# Patient Record
Sex: FEMALE | Race: WHITE
[De-identification: names, ages, dates, MRNs, and addresses within clinical notes are randomized per-mention and may not be internally consistent; named-entity substitution may affect disease eponyms.]

---

## 2017-11-13 ENCOUNTER — HOSPITAL ENCOUNTER (OUTPATIENT)
Dept: HOSPITAL 92 - ULT | Age: 32
Discharge: HOME | End: 2017-11-13
Attending: FAMILY MEDICINE
Payer: COMMERCIAL

## 2017-11-13 DIAGNOSIS — Z3A.19: ICD-10-CM

## 2017-11-13 DIAGNOSIS — Z34.82: Primary | ICD-10-CM

## 2017-11-13 PROCEDURE — 76805 OB US >/= 14 WKS SNGL FETUS: CPT

## 2017-11-13 NOTE — ULT
EXAM:

OB ULTRASOUND:

 

HISTORY: 

A 20-week exam.  Evaluate anatomy, size and dates.

 

COMPARISON: 

None.

 

TECHNIQUE: 

Sagittal and transverse imaging of the gravid uterus is performed.

 

FINDINGS: 

Single intrauterine gestation, vertex presentation.  Fetal heart tones with a rate of 155 b.p.m.

 

Cervical length is 4.9 cm.  Posterior placenta.  No definite previa. 

 

Fetal Biometry:

BPD                       4.44 cm, 19 weeks 3 days

Head circumference       17.72 cm, 20 weeks 1 day

Abdominal circumference  15.52 cm, 20 weeks 5 days

Femur length              3.42 cm, 20 weeks 5 days

 

Average by sonography is 20 weeks 1 day.  Estimated fetal weight is 366 gm.

 

Fetal Survey:

The following structures are adequately demonstrated:  lateral ventricle, cord insertion, 3-vessel c
ord, nose and lips, and urinary bladder.

 

Limited evaluation of the cerebellum, 4-chamber heart, stomach, spine, and kidneys.  Visualized extr
emities appear to be unremarkable.

 

IMPRESSION: 

1.  Single intrauterine gestation with fetal heart tones.  Average by sonography is 20 weeks 1 day.

2.  Fetal survey as above.  Examination is limited due to patient body habitus.

 

POS: ZULMA

## 2018-02-06 ENCOUNTER — HOSPITAL ENCOUNTER (OUTPATIENT)
Dept: HOSPITAL 92 - BICULT | Age: 33
Discharge: HOME | End: 2018-02-06
Attending: FAMILY MEDICINE
Payer: COMMERCIAL

## 2018-02-06 DIAGNOSIS — O36.63X0: Primary | ICD-10-CM

## 2018-02-06 DIAGNOSIS — Z3A.34: ICD-10-CM

## 2018-02-06 PROCEDURE — 82951 GLUCOSE TOLERANCE TEST (GTT): CPT

## 2018-02-06 PROCEDURE — 36415 COLL VENOUS BLD VENIPUNCTURE: CPT

## 2018-02-06 PROCEDURE — 82952 GTT-ADDED SAMPLES: CPT

## 2018-02-06 PROCEDURE — 76816 OB US FOLLOW-UP PER FETUS: CPT

## 2018-03-26 ENCOUNTER — HOSPITAL ENCOUNTER (INPATIENT)
Dept: HOSPITAL 92 - L&D | Age: 33
LOS: 4 days | Discharge: HOME | End: 2018-03-30
Attending: FAMILY MEDICINE | Admitting: FAMILY MEDICINE
Payer: COMMERCIAL

## 2018-03-26 VITALS — BODY MASS INDEX: 37.4 KG/M2

## 2018-03-26 DIAGNOSIS — Z3A.39: ICD-10-CM

## 2018-03-26 DIAGNOSIS — O99.343: Primary | ICD-10-CM

## 2018-03-26 DIAGNOSIS — F98.8: ICD-10-CM

## 2018-03-26 LAB
HBSAG INDEX: 0.15 S/CO (ref 0–0.99)
HGB BLD-MCNC: 11.4 G/DL (ref 12–16)
MCH RBC QN AUTO: 30.8 PG (ref 27–31)
MCV RBC AUTO: 89.4 FL (ref 81–99)
PLATELET # BLD AUTO: 191 THOU/UL (ref 130–400)
RBC # BLD AUTO: 3.7 MILL/UL (ref 4.2–5.4)
SYPHILIS ANTIBODY INDEX: 0.07 S/CO
WBC # BLD AUTO: 8.4 THOU/UL (ref 4.8–10.8)

## 2018-03-26 PROCEDURE — 86780 TREPONEMA PALLIDUM: CPT

## 2018-03-26 PROCEDURE — 51702 INSERT TEMP BLADDER CATH: CPT

## 2018-03-26 PROCEDURE — 87340 HEPATITIS B SURFACE AG IA: CPT

## 2018-03-26 PROCEDURE — 85027 COMPLETE CBC AUTOMATED: CPT

## 2018-03-26 PROCEDURE — 3E0P7VZ INTRODUCTION OF HORMONE INTO FEMALE REPRODUCTIVE, VIA NATURAL OR ARTIFICIAL OPENING: ICD-10-PCS | Performed by: FAMILY MEDICINE

## 2018-03-26 PROCEDURE — A4216 STERILE WATER/SALINE, 10 ML: HCPCS

## 2018-03-26 PROCEDURE — 76815 OB US LIMITED FETUS(S): CPT

## 2018-03-26 PROCEDURE — 36415 COLL VENOUS BLD VENIPUNCTURE: CPT

## 2018-03-26 PROCEDURE — 86900 BLOOD TYPING SEROLOGIC ABO: CPT

## 2018-03-26 PROCEDURE — 86850 RBC ANTIBODY SCREEN: CPT

## 2018-03-26 PROCEDURE — 3E033VJ INTRODUCTION OF OTHER HORMONE INTO PERIPHERAL VEIN, PERCUTANEOUS APPROACH: ICD-10-PCS | Performed by: FAMILY MEDICINE

## 2018-03-26 PROCEDURE — 86901 BLOOD TYPING SEROLOGIC RH(D): CPT

## 2018-03-26 NOTE — HP
HISTORY OF PRESENT ILLNESS:  This is a 33-year-old white female G3, P1-0-1-1 at 39-week gestation wit
h EDC of 03/30/2018.  Patient's prenatal course has been uncomplicated.  She has a history of 1 spont
aneous AB as well as 1 vaginal delivery at 40 weeks, uncomplicated.  She lives approximately 1 hour a
way and concerned about delivering out of the hospital.  She is being admitted for an elective Cytote
c/Pitocin induction.

 

PAST MEDICAL HISTORY:

1.  ADD.

2.  Tobacco history which patient has quit.

 

PAST SURGICAL HISTORY:  Spontaneous vaginal delivery x1.

 

FAMILY HISTORY:  Paternal grandfather with lung cancer.  Paternal grandmother with diabetes.

 

SOCIAL HISTORY:  She is .  She does not smoke, does not drink.  She has one son Sticker and a 
stepson name, Twister.

 

REVIEW OF SYSTEMS:  As above.

 

PHYSICAL EXAMINATION:

VITAL SIGNS:  Stable, afebrile.

HEENT:  Clear.

HEART:  Clear.

LUNGS:  Clear.

ABDOMEN:  Gravid.

EXTREMITIES:  With no edema.

 

LABORATORY DATA:  GBS negative, 3-hour GTT negative, HIV negative.  CBC normal.  One-hour HPV negativ
e.  Pap smear normal.  GC chlamydia negative.  Urine culture negative, hepatitis B negative, HIV nega
tive, B positive blood type, RPR negative, rubella immune.  TSH normal.

 

ASSESSMENT:

1.  Term pregnancy.

2.  Lives 1 hour away.

3.  History of one normal vaginal delivery.

 

PLAN:

1.  Routine L&D orders.

2.  Routine anesthesia orders.

3.  Cytotec plus Pitocin induction.  Anticipate normal vaginal delivery.

## 2018-03-27 PROCEDURE — 10907ZC DRAINAGE OF AMNIOTIC FLUID, THERAPEUTIC FROM PRODUCTS OF CONCEPTION, VIA NATURAL OR ARTIFICIAL OPENING: ICD-10-PCS | Performed by: FAMILY MEDICINE

## 2018-03-27 NOTE — OP
DATE OF PROCEDURE:  03/27/2018

 

PREOPERATIVE DIAGNOSES:

1.  Term pregnancy.

2.  Failure to progress.

 

POSTOPERATIVE DIAGNOSES:

1.  Term pregnancy.

2.  Failure to progress.

3.  Asynclitic presentation.  Cord around the body x1, around the arm x1.

 

SURGEON:  Tacos Santana M.D.

 

ASSISTANT:  Joe Lopez M.D.

 

ANESTHESIA:  Epidural.

 

PROCEDURE IN DETAIL:  This 33-year-old white female G3, P1 taken to the operating room.  She was plac
ed in supine position.  The abdomen prepped and draped sterilely.  Pfannenstiel incision was made.  S
ubcutaneous was dissected down to fascia.  Fascia was opened without incident.  Peritoneum was opened
 by blunt dissection.  Bladder flap was dissected inferiorly.  Low transverse uterine incision was ma
de.  Fluid was noted to be clear.  Delivered the baby from the asynclitic presentation.  Caput is on 
the parietal lobe of the scalp.  Baby did breathe and cry spontaneously upon delivery.  Delivered srinivasan
centa, 3-vessel intact.  Closed the uterus in 1 layer of #1 Monocryl.  Two figure-of-eight stitches w
ere placed to control bleeding.  The abdomen was evacuated of all clots.  The Nathanael O ring was remov
ed.  The peritoneum was closed with 2-0 chromic.  The fascia was closed with 0 Vicryl.  The subcu wit
h 2-0 chromic and the skin was closed with staples.  Estimated blood loss was 900 mL.  Mother and bab
y did well.

## 2018-03-28 LAB
HGB BLD-MCNC: 9.5 G/DL (ref 12–16)
MCH RBC QN AUTO: 30.4 PG (ref 27–31)
MCV RBC AUTO: 90.9 FL (ref 81–99)
PLATELET # BLD AUTO: 164 THOU/UL (ref 130–400)
RBC # BLD AUTO: 3.11 MILL/UL (ref 4.2–5.4)
WBC # BLD AUTO: 13.5 THOU/UL (ref 4.8–10.8)

## 2018-03-28 RX ADMIN — HYDROCODONE BITARTRATE AND ACETAMINOPHEN PRN TAB: 5; 325 TABLET ORAL at 17:40

## 2018-03-28 RX ADMIN — DOCUSATE CALCIUM SCH MG: 240 CAPSULE, LIQUID FILLED ORAL at 21:08

## 2018-03-28 NOTE — ADD-OP
DATE OF ENCOUNTER:  2018

 

This is documentation that Dr. Joe Lopez was first assist on a  performed  by Dr. Tacos Santana.

## 2018-03-29 RX ADMIN — DOCUSATE CALCIUM SCH MG: 240 CAPSULE, LIQUID FILLED ORAL at 21:13

## 2018-03-29 RX ADMIN — DOCUSATE CALCIUM SCH MG: 240 CAPSULE, LIQUID FILLED ORAL at 09:14

## 2018-03-29 RX ADMIN — HYDROCODONE BITARTRATE AND ACETAMINOPHEN PRN TAB: 5; 325 TABLET ORAL at 21:13

## 2018-03-29 RX ADMIN — HYDROCODONE BITARTRATE AND ACETAMINOPHEN PRN TAB: 5; 325 TABLET ORAL at 09:24

## 2018-03-30 VITALS — TEMPERATURE: 98 F | SYSTOLIC BLOOD PRESSURE: 109 MMHG | DIASTOLIC BLOOD PRESSURE: 61 MMHG

## 2018-03-30 RX ADMIN — HYDROCODONE BITARTRATE AND ACETAMINOPHEN PRN TAB: 5; 325 TABLET ORAL at 05:29

## 2018-03-30 RX ADMIN — DOCUSATE CALCIUM SCH MG: 240 CAPSULE, LIQUID FILLED ORAL at 08:08

## 2018-03-30 NOTE — DIS
DISCHARGE DIAGNOSES:

1.  Term pregnancy.

2.  Failure to progress.

3.  Asynclitic presentation.

 

PROCEDURE:  Primary low transverse  section.

 

SURGEON:  Tacos Santana M.D.

 

ASSISTANT:  Dr. Lopez.

 

BRIEF HISTORY:  This is a 33-year-old white female  with an uncomplicated prenatal course.  She 
presented at 40 weeks for a Cytotec/Pitocin induction.  She lives 1 hour away and was concerned about
 home delivery.

 

HOSPITAL COURSE:  The patient was admitted.  She was placed on Cytotec and Pitocin.  She progressed t
o complete.  However, she had failure to progress.  Fetal heart tones remained reactive.  The patient
 underwent a primary  section without complication.  Mother and baby did very well.  The baby
 was noted to be in asynclitic presentation.  Baby had failed to descend.  Postoperatively the patien
t did well.  She is now ready for discharge.  

 

Vital signs stable, afebrile.  Discharge H&H is 9.5 and 28.3.  

 

The patient will follow up in the office in the next 2 weeks.  Hydrocodone will be called out to her 
pharmacy for pain control.  Her wound has also remained clear without drainage throughout her stay.